# Patient Record
Sex: MALE | Race: WHITE
[De-identification: names, ages, dates, MRNs, and addresses within clinical notes are randomized per-mention and may not be internally consistent; named-entity substitution may affect disease eponyms.]

---

## 2020-01-20 ENCOUNTER — HOSPITAL ENCOUNTER (EMERGENCY)
Dept: HOSPITAL 47 - EC | Age: 40
Discharge: HOME | End: 2020-01-20
Payer: COMMERCIAL

## 2020-01-20 VITALS — HEART RATE: 88 BPM

## 2020-01-20 VITALS — RESPIRATION RATE: 16 BRPM | DIASTOLIC BLOOD PRESSURE: 102 MMHG | SYSTOLIC BLOOD PRESSURE: 176 MMHG

## 2020-01-20 VITALS — TEMPERATURE: 98.4 F

## 2020-01-20 DIAGNOSIS — M62.838: ICD-10-CM
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
## 2020-01-20 DIAGNOSIS — S06.0X0A: Primary | ICD-10-CM
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
## 2020-01-20 DIAGNOSIS — I10: ICD-10-CM

## 2020-01-20 DIAGNOSIS — Z88.8: ICD-10-CM

## 2020-01-20 PROCEDURE — 99284 EMERGENCY DEPT VISIT MOD MDM: CPT

## 2020-01-20 PROCEDURE — 96372 THER/PROPH/DIAG INJ SC/IM: CPT

## 2020-01-20 PROCEDURE — 72125 CT NECK SPINE W/O DYE: CPT

## 2020-01-20 PROCEDURE — 70450 CT HEAD/BRAIN W/O DYE: CPT

## 2020-01-20 PROCEDURE — 71046 X-RAY EXAM CHEST 2 VIEWS: CPT
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
## 2025-03-25 ENCOUNTER — HOSPITAL ENCOUNTER (OUTPATIENT)
Dept: HOSPITAL 47 - RADNMMAIN | Age: 45
Discharge: HOME | End: 2025-03-25
Attending: FAMILY MEDICINE
Payer: COMMERCIAL

## 2025-03-25 DIAGNOSIS — I10: Primary | ICD-10-CM

## 2025-03-25 DIAGNOSIS — Z82.49: ICD-10-CM

## 2025-03-25 PROCEDURE — 93017 CV STRESS TEST TRACING ONLY: CPT

## 2025-05-07 ENCOUNTER — HOSPITAL ENCOUNTER (OUTPATIENT)
Dept: HOSPITAL 47 - RADMRIMAIN | Age: 45
Discharge: HOME | End: 2025-05-07
Attending: FAMILY MEDICINE
Payer: COMMERCIAL

## 2025-05-07 DIAGNOSIS — M50.123: Primary | ICD-10-CM

## 2025-05-07 DIAGNOSIS — R20.2: ICD-10-CM

## 2025-05-07 PROCEDURE — 72141 MRI NECK SPINE W/O DYE: CPT
